# Patient Record
Sex: FEMALE | Race: AMERICAN INDIAN OR ALASKA NATIVE
[De-identification: names, ages, dates, MRNs, and addresses within clinical notes are randomized per-mention and may not be internally consistent; named-entity substitution may affect disease eponyms.]

---

## 2019-02-20 ENCOUNTER — HOSPITAL ENCOUNTER (EMERGENCY)
Dept: HOSPITAL 5 - ED | Age: 39
LOS: 1 days | Discharge: HOME | End: 2019-02-21
Payer: COMMERCIAL

## 2019-02-20 VITALS — DIASTOLIC BLOOD PRESSURE: 77 MMHG | SYSTOLIC BLOOD PRESSURE: 118 MMHG

## 2019-02-20 DIAGNOSIS — R07.9: ICD-10-CM

## 2019-02-20 DIAGNOSIS — R91.8: Primary | ICD-10-CM

## 2019-02-20 DIAGNOSIS — R42: ICD-10-CM

## 2019-02-20 LAB
BASOPHILS # (AUTO): 0 K/MM3 (ref 0–0.1)
BASOPHILS NFR BLD AUTO: 0.5 % (ref 0–1.8)
BUN SERPL-MCNC: 17 MG/DL (ref 7–17)
BUN/CREAT SERPL: 19 %
CALCIUM SERPL-MCNC: 9 MG/DL (ref 8.4–10.2)
EOSINOPHIL # BLD AUTO: 0.2 K/MM3 (ref 0–0.4)
EOSINOPHIL NFR BLD AUTO: 2.2 % (ref 0–4.3)
HCT VFR BLD CALC: 39.8 % (ref 30.3–42.9)
HEMOLYSIS INDEX: 3
HGB BLD-MCNC: 13.3 GM/DL (ref 10.1–14.3)
INR PPP: 0.95 (ref 0.87–1.13)
LYMPHOCYTES # BLD AUTO: 1.4 K/MM3 (ref 1.2–5.4)
LYMPHOCYTES NFR BLD AUTO: 15.5 % (ref 13.4–35)
MCHC RBC AUTO-ENTMCNC: 33 % (ref 30–34)
MCV RBC AUTO: 93 FL (ref 79–97)
MONOCYTES # (AUTO): 0.5 K/MM3 (ref 0–0.8)
MONOCYTES % (AUTO): 6 % (ref 0–7.3)
PLATELET # BLD: 330 K/MM3 (ref 140–440)
RBC # BLD AUTO: 4.3 M/MM3 (ref 3.65–5.03)

## 2019-02-20 PROCEDURE — 93010 ELECTROCARDIOGRAM REPORT: CPT

## 2019-02-20 PROCEDURE — 96361 HYDRATE IV INFUSION ADD-ON: CPT

## 2019-02-20 PROCEDURE — 80048 BASIC METABOLIC PNL TOTAL CA: CPT

## 2019-02-20 PROCEDURE — 96374 THER/PROPH/DIAG INJ IV PUSH: CPT

## 2019-02-20 PROCEDURE — 71045 X-RAY EXAM CHEST 1 VIEW: CPT

## 2019-02-20 PROCEDURE — 99285 EMERGENCY DEPT VISIT HI MDM: CPT

## 2019-02-20 PROCEDURE — 85610 PROTHROMBIN TIME: CPT

## 2019-02-20 PROCEDURE — 36415 COLL VENOUS BLD VENIPUNCTURE: CPT

## 2019-02-20 PROCEDURE — 85025 COMPLETE CBC W/AUTO DIFF WBC: CPT

## 2019-02-20 PROCEDURE — 93005 ELECTROCARDIOGRAM TRACING: CPT

## 2019-02-20 PROCEDURE — 84484 ASSAY OF TROPONIN QUANT: CPT

## 2019-02-20 PROCEDURE — 84702 CHORIONIC GONADOTROPIN TEST: CPT

## 2019-02-20 PROCEDURE — 71275 CT ANGIOGRAPHY CHEST: CPT

## 2019-02-20 PROCEDURE — 96375 TX/PRO/DX INJ NEW DRUG ADDON: CPT

## 2019-02-20 NOTE — XRAY REPORT
FINAL REPORT



EXAM:  XR CHEST 1V AP



HISTORY:  pleuritic cp 



TECHNIQUE:  upright single view chest



PRIORS:  None.



FINDINGS:  

Cardiac and mediastinal contours are unremarkable.  No focal pulmonary infiltrate is identified.  No 
pleural fluid collection seen. Pulmonary vasculature is unremarkable. 



IMPRESSION:  

Negative single-view chest

## 2019-02-20 NOTE — CAT SCAN REPORT
FINAL REPORT



EXAM:  CT ANGIO CHEST



HISTORY:  pleuritic cp 



TECHNIQUE:  CT chest CT angiogram with reconstructions 



PRIORS:  None. 



FINDINGS:  

There is no evidence of filling defect within the central pulmonary vasculature to suggest the presen
ce of acute pulmonary embolus. 



At the AP window there is mass measuring 1.6 x 2.5 centimeters most consistent with adenopathy. 



Heart and great vessels are unremarkable.  The aorta is normal in caliber. 



There is a left upper lobe medial anterior pulmonary mass measuring 1.9 x 1.7 centimeters with adjace
nt linear density. 



Visualized portion of the upper abdomen demonstrates no acute change. 



IMPRESSION:  

Left upper lobe pulmonary mass 



Mediastinal adenopathy with an AP window enlarged node identified 



Findings are highly suspicious for neoplasm. Consider biopsy for further evaluation 



Findings were discussed with Dr. Brennan at 11:29 p.m.   EST on February 20, 2019

## 2019-02-20 NOTE — EMERGENCY DEPARTMENT REPORT
ED Chest Pain HPI





- General


Chief Complaint: Chest Pain


Stated Complaint: CHEST PAIN


Time Seen by Provider: 02/20/19 20:29


Source: patient, EMS (ems notes not available at time of  chart dictation)


Mode of arrival: Ambulatory


Limitations: No Limitations





- History of Present Illness


Initial Comments: 





This is a 38-year-old female, not known to this provider previously, who reports

that she is not pregnant.


The patient reports no significant past medical history.





The patient works as a .





Patient reports that she was in her usual state of health, when at 3:30 this 

afternoon, developed sudden central chest pain, which she describes as aching in

nature, worsens with deep inspiration, and patient felt like she would pass out.

 This passing out sensation has since resolved.  The patient is still having 

chest pain.  She denies headache, neck pain, abdominal pain, leg pain, leg 

swelling, recent aspirin use, recent cocaine use.  Patient reports multiple long

flights within the past month, the longest of which was 9 hours.  She reports 

that during these flights, she is sometimes sitting down, and sometimes standing

up.  She denies hematemesis, bright red blood per rectum.  There is no family 

history of coronary artery disease that she is aware of, and there is no family 

history of DVT or pulmonary embolus that she is aware of.


MD Complaint: chest pain


-: Sudden


Onset: during rest


Pain Location: substernal


Pain Radiation: none


Severity scale (0 -10): 5


Quality: aching


Consistency: constant


Improves With: rest


Worsens With: inspiration


Context: recent travel


Treatments Prior to Arrival: aspirin


Aspirin use within the Past 7 Days: (0) No (prior to EMS arrival, no aspirin 

use)





- Related Data


On Oral Contraceptives: No


                                    Allergies











Allergy/AdvReac Type Severity Reaction Status Date / Time


 


No Known Allergies Allergy   Unverified 02/20/19 19:02














Heart Score





- HEART Score


History: Slightly suspicious


EKG: Non-specific


Age: < 45


Risk factors: No known risk factors


Troponin: < normal limit


HEART Score: 1





- Critical Actions


Critical Actions: 0-3 pts:0.9-1.7%risk of adverse cardiac event.Candidate for 

discharge





ED Review of Systems


ROS: 


Stated complaint: CHEST PAIN


Other details as noted in HPI





Constitutional: denies: fever


Eyes: denies: vision change


ENT: denies: epistaxis, congestion


Respiratory: denies: cough


Cardiovascular: chest pain, syncope (near syncope)


Gastrointestinal: abdominal pain.  denies: nausea, vomiting, hematemesis, 

melena, hematochezia


Genitourinary: denies: abnormal menses


Musculoskeletal: denies: back pain


Skin: denies: lesions


Neurological: denies: headache


Psychiatric: denies: anxiety





ED Past Medical Hx





- Past Medical History


Previous Medical History?: Yes


Additional medical history: colorectal Ca-2012





- Surgical History


Past Surgical History?: Yes


Additional Surgical History: ectopic ruptured - 2014.  colorectal CA surgeries -

2011





- Social History


Smoking Status: Never Smoker


Substance Use Type: Alcohol





ED Physical Exam





- General


Limitations: No Limitations


General appearance: alert, in no apparent distress





- Head


Head exam: Present: atraumatic, normocephalic





- Eye


Eye exam: Present: normal appearance, EOMI.  Absent: nystagmus





- ENT


ENT exam: Present: normal exam, normal orophraynx, mucous membranes moist, nor

mal external ear exam





- Neck


Neck exam: Present: normal inspection, full ROM.  Absent: tenderness, 

meningismus





- Respiratory


Respiratory exam: Present: normal lung sounds bilaterally.  Absent: respiratory 

distress





- Cardiovascular


Cardiovascular Exam: Present: normal rhythm, bradycardia, normal heart sounds.  

Absent: systolic murmur, diastolic murmur, rubs, gallop





- GI/Abdominal


GI/Abdominal exam: Present: soft.  Absent: distended, tenderness, guarding, 

rebound, rigid, pulsatile mass





- Extremities Exam


Extremities exam: Present: normal inspection, full ROM, other (2+ pulses noted 

in the bilateral upper, lower extremities.  Compartments soft.  No long bony 

tenderness.  The pelvis is stable.).  Absent: pedal edema, joint swelling, calf 

tenderness





- Back Exam


Back exam: Present: normal inspection, full ROM.  Absent: tenderness, CVA 

tenderness (R), paraspinal tenderness, vertebral tenderness





- Neurological Exam


Neurological exam: Present: alert, oriented X3, CN II-XII intact, normal gait, 

other (Extraocular movements intact.  Tongue midline.  No facial droop.  Facial 

sensation intact to light touch in the V1, V2, V3 distribution bilaterally.  5 

and 5 strength in 4 extremities..  Sensation is intact to light touch in 4 

extremities.).  Absent: motor sensory deficit





- Psychiatric


Psychiatric exam: Present: normal affect, normal mood





- Skin


Skin exam: Present: warm, dry, intact, normal color.  Absent: rash





ED Course


                                   Vital Signs











  02/20/19 02/20/19 02/20/19





  18:30 20:26 21:00


 


Temperature 98.5 F 97.8 F 


 


Pulse Rate 67 55 L 54 L


 


Respiratory 18 13 11 L





Rate   


 


Blood Pressure  120/84 121/79


 


Blood Pressure 124/83 120/84 





[Right]   


 


O2 Sat by Pulse 100 100 79 L





Oximetry   














  02/20/19 02/20/19





  22:00 23:00


 


Temperature  


 


Pulse Rate 52 L 53 L


 


Respiratory 12 14





Rate  


 


Blood Pressure 123/79 118/77


 


Blood Pressure  





[Right]  


 


O2 Sat by Pulse  





Oximetry  














- Reevaluation(s)


Reevaluation #1: 





02/20/19 21:13


Differential diagnosis, including but not limited to: Acute coronary syndrome, 

GERD, gastritis, hiatal hernia, pulmonary embolus, pneumonia


Orthostasis, vasovagal event, structural cardiac disease














Assessment and plan: 38-year-old female who works as a  with 

pleuritic chest pain, and near syncope.  The patient is afebrile, with 

reassuring vital signs, and is not tachycardic or hypoxic.  She has no lower 

extremity evidence of thromboembolic disease.  However, given her occupational 

history, I do not find the patient to be low risk by well's criteria, and I do 

not find the patient to be suitable to be risk stratified by a d-dimer.  

Therefore, we will treat her pain, and obtain CT scan of the chest.  We will 

also obtain serial EKGs, and serial cardiac enzymes.





Reevaluation #2: 





02/20/19 22:23


Laboratory studies unremarkable.  Chest x-ray unremarkable.  Repeat EKG 

essentially unchanged from prior.  CT scan of the chest interpretation is 

pending.  Patient resting comfortably in stretcher, and in no acute distress, 

noted to be walking around the department, also, in no acute distress.


Reevaluation #3: 





02/20/19 23:38


Patient resting comfortably and in no acute distress.  Troponin negative 2.  

EKG unchanged 2.  CT scan shows no pulmonary embolus or pneumonia or dissection

 or pneumothorax, but demonstrates adenopathy, and concerning upper lobe lesion.





Contacted pulmonology on-call, Dr. Galaviz, who indicated his group will be able

 to follow the patient up closely as an outpatient, obtain biopsy, tissue 

diagnosis, and make further arrangements from there.  The patient was given a 

copy of her CT scan report, and she was instructed to very very closely follow 

up with outpatient pulmonology or primary care doctor to establish a definitive 

tissue diagnosis.  Return precautions were reviewed.





After being informed of CT scan findings, and need for close outpatient follow-

up, the patient endorses additional past medical history of colorectal cancer, 

and [place].

















02/20/19 23:59





Reevaluation #4: 





02/20/19 23:42


On multiple evaluations, the patient is not noted to be hypoxic.  The documented

 pulse ox appears to be an error.





AUDRA score





- Audra Score


Age > 65: (0) No


Aspirin use within the Past 7 Days: (0) No


3 or more CAD Risk Factors: (0) No


2 or more Angina events in past 24 hrs: (0) No


Known CAD with more than 50% Stenosis: (0) No


Elevated Cardiac Markers: (0) No


ST Deviation Greater than 0.5mm: (0) No


AUDRA Score: 0





ED Medical Decision Making





- Lab Data


Result diagrams: 


                                 02/20/19 20:26





                                 02/20/19 20:26








                                   Vital Signs











  02/20/19 02/20/19





  18:30 20:26


 


Temperature 98.5 F 97.8 F


 


Pulse Rate 67 55 L


 


Respiratory 18 13





Rate  


 


Blood Pressure  120/84


 


Blood Pressure 124/83 120/84





[Right]  


 


O2 Sat by Pulse 100 100





Oximetry  











                                   Lab Results











  02/20/19 02/20/19 02/20/19 Range/Units





  20:26 20:26 20:31 


 


WBC  8.8    (4.5-11.0)  K/mm3


 


RBC  4.30    (3.65-5.03)  M/mm3


 


Hgb  13.3    (10.1-14.3)  gm/dl


 


Hct  39.8    (30.3-42.9)  %


 


MCV  93    (79-97)  fl


 


MCH  31    (28-32)  pg


 


MCHC  33    (30-34)  %


 


RDW  12.9 L    (13.2-15.2)  %


 


Plt Count  330    (140-440)  K/mm3


 


Lymph % (Auto)  15.5    (13.4-35.0)  %


 


Mono % (Auto)  6.0    (0.0-7.3)  %


 


Eos % (Auto)  2.2    (0.0-4.3)  %


 


Baso % (Auto)  0.5    (0.0-1.8)  %


 


Lymph #  1.4    (1.2-5.4)  K/mm3


 


Mono #  0.5    (0.0-0.8)  K/mm3


 


Eos #  0.2    (0.0-0.4)  K/mm3


 


Baso #  0.0    (0.0-0.1)  K/mm3


 


Seg Neutrophils %  75.8 H    (40.0-70.0)  %


 


Seg Neutrophils #  6.7    (1.8-7.7)  K/mm3


 


PT    13.3  (12.2-14.9)  Sec.


 


INR    0.95  (0.87-1.13)  


 


Sodium   138   (137-145)  mmol/L


 


Potassium   3.8   (3.6-5.0)  mmol/L


 


Chloride   101.3   ()  mmol/L


 


Carbon Dioxide   25   (22-30)  mmol/L


 


Anion Gap   16   mmol/L


 


BUN   17   (7-17)  mg/dL


 


Creatinine   0.9   (0.7-1.2)  mg/dL


 


Estimated GFR   > 60   ml/min


 


BUN/Creatinine Ratio   19   %


 


Glucose   86   ()  mg/dL


 


Calcium   9.0   (8.4-10.2)  mg/dL


 


Troponin T   < 0.010   (0.00-0.029)  ng/mL


 


HCG, Quant     (0-4)  mIU/mL














  02/20/19 Range/Units





  20:31 


 


WBC   (4.5-11.0)  K/mm3


 


RBC   (3.65-5.03)  M/mm3


 


Hgb   (10.1-14.3)  gm/dl


 


Hct   (30.3-42.9)  %


 


MCV   (79-97)  fl


 


MCH   (28-32)  pg


 


MCHC   (30-34)  %


 


RDW   (13.2-15.2)  %


 


Plt Count   (140-440)  K/mm3


 


Lymph % (Auto)   (13.4-35.0)  %


 


Mono % (Auto)   (0.0-7.3)  %


 


Eos % (Auto)   (0.0-4.3)  %


 


Baso % (Auto)   (0.0-1.8)  %


 


Lymph #   (1.2-5.4)  K/mm3


 


Mono #   (0.0-0.8)  K/mm3


 


Eos #   (0.0-0.4)  K/mm3


 


Baso #   (0.0-0.1)  K/mm3


 


Seg Neutrophils %   (40.0-70.0)  %


 


Seg Neutrophils #   (1.8-7.7)  K/mm3


 


PT   (12.2-14.9)  Sec.


 


INR   (0.87-1.13)  


 


Sodium   (137-145)  mmol/L


 


Potassium   (3.6-5.0)  mmol/L


 


Chloride   ()  mmol/L


 


Carbon Dioxide   (22-30)  mmol/L


 


Anion Gap   mmol/L


 


BUN   (7-17)  mg/dL


 


Creatinine   (0.7-1.2)  mg/dL


 


Estimated GFR   ml/min


 


BUN/Creatinine Ratio   %


 


Glucose   ()  mg/dL


 


Calcium   (8.4-10.2)  mg/dL


 


Troponin T   (0.00-0.029)  ng/mL


 


HCG, Quant  < 2  (0-4)  mIU/mL














- EKG Data


-: EKG Interpreted by Me


EKG shows normal: sinus rhythm


Rate: normal





- EKG Data


When compared to previous EKG there are: previous EKG unavailable





02/20/19 21:14


Sinus, 63 beats per minutes, normal axis, low voltage in the anteroseptal leads,

 not consistent with ST elevation myocardial infarction, there is no prior EKG 

available for comparison.





- Radiology Data


Radiology results: report reviewed, image reviewed





X-ray of the chest is negative for acute disease.


Critical care attestation.: 


If time is entered above; I have spent that time in minutes in the direct care 

of this critically ill patient, excluding procedure time.








ED Disposition


Clinical Impression: 


 Lung mass, Chest pain





Disposition: DC-01 TO HOME OR SELFCARE


Is pt being admited?: No


Does the pt Need Aspirin: No


Condition: Stable


Additional Instructions: 


Follow up with the primary care doctor or pulmonary doctor as soon as possible 

to further evaluate CT scan demonstrated left upper lung mass.








Not following up closely as recommended may result in undiagnosed tumor, cancer,

 malignancy.





I have contacted the pulmonology physician on call, Dr. Galaviz, and he 

indicates his group will be happy to see within the next couple days.  He works 

in conjunction with Dr. Main.  Contact the office first thing in the morning, 

informed the office staff that I have spoken to Dr. Galaviz, and that he would 

like you to be seen within the next few days to establish a diagnosis.  

Alternatively, another local pulmonology specialist is Dr. Araujo





Patient should drink for 6 cups of water per day, consumes at least 1.5-2 g of 

salt on a daily basis, and patient may take ibuprofen, 600 mg, with food, 

over-the-counter, every 6 hours as needed for pain, alternating with 

acetaminophen, 650 mg, every 4-6 hours, as needed for pain.





Please return to the emergency room right away with new, worsening or different 

symptoms.








Referrals: 


MARINA MAIN MD [Staff Physician] - 3-5 Days


JOSE J ARAUJO MD [Staff Physician] - 3-5 Days

## 2022-06-16 ENCOUNTER — APPOINTMENT (RX ONLY)
Dept: URBAN - METROPOLITAN AREA CLINIC 288 | Facility: CLINIC | Age: 42
Setting detail: DERMATOLOGY
End: 2022-06-16